# Patient Record
Sex: MALE | Race: WHITE
[De-identification: names, ages, dates, MRNs, and addresses within clinical notes are randomized per-mention and may not be internally consistent; named-entity substitution may affect disease eponyms.]

---

## 2020-10-08 ENCOUNTER — HOSPITAL ENCOUNTER (OUTPATIENT)
Dept: HOSPITAL 75 - RAD | Age: 30
End: 2020-10-08
Attending: NURSE PRACTITIONER
Payer: COMMERCIAL

## 2020-10-08 DIAGNOSIS — J34.89: Primary | ICD-10-CM
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
## 2020-10-08 PROCEDURE — 70486 CT MAXILLOFACIAL W/O DYE: CPT

## 2020-10-08 PROCEDURE — 70450 CT HEAD/BRAIN W/O DYE: CPT
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
## 2021-09-16 ENCOUNTER — HOSPITAL ENCOUNTER (OUTPATIENT)
Dept: HOSPITAL 75 - PREOP | Age: 31
Discharge: HOME | End: 2021-09-16
Attending: SURGERY
Payer: COMMERCIAL

## 2021-09-16 VITALS — HEIGHT: 70.98 IN | BODY MASS INDEX: 41.33 KG/M2 | WEIGHT: 295.2 LBS

## 2021-09-16 DIAGNOSIS — Z01.818: Primary | ICD-10-CM

## 2021-09-24 ENCOUNTER — HOSPITAL ENCOUNTER (OUTPATIENT)
Dept: HOSPITAL 75 - ENDO | Age: 31
Discharge: HOME | End: 2021-09-24
Attending: SURGERY
Payer: COMMERCIAL

## 2021-09-24 VITALS — DIASTOLIC BLOOD PRESSURE: 72 MMHG | SYSTOLIC BLOOD PRESSURE: 120 MMHG

## 2021-09-24 VITALS — SYSTOLIC BLOOD PRESSURE: 109 MMHG | DIASTOLIC BLOOD PRESSURE: 77 MMHG

## 2021-09-24 VITALS — DIASTOLIC BLOOD PRESSURE: 73 MMHG | SYSTOLIC BLOOD PRESSURE: 105 MMHG

## 2021-09-24 VITALS — DIASTOLIC BLOOD PRESSURE: 61 MMHG | SYSTOLIC BLOOD PRESSURE: 101 MMHG

## 2021-09-24 VITALS — SYSTOLIC BLOOD PRESSURE: 106 MMHG | DIASTOLIC BLOOD PRESSURE: 55 MMHG

## 2021-09-24 VITALS — BODY MASS INDEX: 41.67 KG/M2 | HEIGHT: 70.87 IN | WEIGHT: 297.62 LBS

## 2021-09-24 DIAGNOSIS — Z80.0: ICD-10-CM

## 2021-09-24 DIAGNOSIS — Z79.899: ICD-10-CM

## 2021-09-24 DIAGNOSIS — I10: ICD-10-CM

## 2021-09-24 DIAGNOSIS — E66.01: ICD-10-CM

## 2021-09-24 DIAGNOSIS — R19.4: Primary | ICD-10-CM

## 2021-09-24 DIAGNOSIS — K21.9: ICD-10-CM

## 2021-09-24 DIAGNOSIS — K62.1: ICD-10-CM

## 2021-09-24 DIAGNOSIS — Z79.891: ICD-10-CM

## 2021-09-24 DIAGNOSIS — Z87.891: ICD-10-CM

## 2021-09-24 NOTE — ANESTHESIA-GENERAL POST-OP
MAC


Patient Condition


Mental Status/LOC:  Same as Preop


Cardiovascular:  Satisfactory


Nausea/Vomiting:  Absent


Respiratory:  Satisfactory


Pain:  Controlled


Complications:  Absent





Post Op Complications


Complications


None





Follow Up Care/Instructions


Patient Instructions


None needed.





Anesthesiology Discharge Order


Discharge Order


Patient is doing well, no complaints, stable vital signs, no apparent adverse 

anesthesia problems.   


No complications reported per nursing.











FREDRICK COTA CRNA              Sep 24, 2021 11:19

## 2021-09-24 NOTE — PROGRESS NOTE-PRE OPERATIVE
Pre-Operative Progress Note


H&P Reviewed


The H&P was reviewed, patient examined and no changes noted.


Date Seen by Provider:  Sep 24, 2021


Time Seen by Provider:  08:38


Date H&P Reviewed:  Sep 24, 2021


Time H&P Reviewed:  08:38


Pre-Operative Diagnosis:  change in bowel habits, family hx colon ca











BEATA SETH DO              Sep 24, 2021 08:38

## 2021-12-23 ENCOUNTER — HOSPITAL ENCOUNTER (EMERGENCY)
Dept: HOSPITAL 75 - ER | Age: 31
Discharge: HOME | End: 2021-12-23
Payer: COMMERCIAL

## 2021-12-23 VITALS — BODY MASS INDEX: 42.01 KG/M2 | WEIGHT: 300.05 LBS | HEIGHT: 70.98 IN

## 2021-12-23 VITALS — SYSTOLIC BLOOD PRESSURE: 142 MMHG | DIASTOLIC BLOOD PRESSURE: 76 MMHG

## 2021-12-23 DIAGNOSIS — F17.210: ICD-10-CM

## 2021-12-23 DIAGNOSIS — N43.3: Primary | ICD-10-CM

## 2021-12-23 LAB
APTT PPP: YELLOW S
BACTERIA #/AREA URNS HPF: NEGATIVE /HPF
BILIRUB UR QL STRIP: NEGATIVE
FIBRINOGEN PPP-MCNC: CLEAR MG/DL
GLUCOSE UR STRIP-MCNC: NEGATIVE MG/DL
KETONES UR QL STRIP: NEGATIVE
LEUKOCYTE ESTERASE UR QL STRIP: NEGATIVE
NITRITE UR QL STRIP: NEGATIVE
PH UR STRIP: 6 [PH] (ref 5–9)
PROT UR QL STRIP: NEGATIVE
RBC #/AREA URNS HPF: (no result) /HPF
SP GR UR STRIP: >=1.03 (ref 1.02–1.02)
SQUAMOUS #/AREA URNS HPF: (no result) /HPF
WBC #/AREA URNS HPF: (no result) /HPF

## 2021-12-23 PROCEDURE — 74176 CT ABD & PELVIS W/O CONTRAST: CPT

## 2021-12-23 PROCEDURE — 81000 URINALYSIS NONAUTO W/SCOPE: CPT

## 2021-12-23 PROCEDURE — 76870 US EXAM SCROTUM: CPT

## 2021-12-23 NOTE — ED GENERAL
General


Chief Complaint:  General Problems/Pain


Stated Complaint:  GROIN PAIN


Source of Information:  Patient


Exam Limitations:  No Limitations


 (KEL LAROSE)





History of Present Illness


Date Seen by Provider:  Dec 23, 2021


Time Seen by Provider:  17:44


Initial Comments


To ER with 3 weeks of right testicular pain and foul-smelling urine but became 

worse than usual 3 days ago. No known injury.


Timing/Duration:  1-2 Days


Severity:  Moderate


 (KEL LAROSE)





Allergies and Home Medications


Allergies


Coded Allergies:  


     No Known Drug Allergies (Unverified , 2/10/16)





Patient Home Medication List


Home Medication List Reviewed:  Yes


 (KEL LAROSE)


Lisinopril (Lisinopril) 40 Mg Tablet, 40 MG PO DAILY, (Reported)


   Entered as Reported by: JULIE A IBEH on 21


Pantoprazole Sodium (Pantoprazole Sodium) 40 Mg Tablet.dr, 40 MG PO DAILY, 

(Reported)


   Entered as Reported by: JULIE A IBEH on 21





Review of Systems


Review of Systems


Constitutional:  see HPI


EENTM:  see HPI


Respiratory:  no symptoms reported


Cardiovascular:  no symptoms reported


Genitourinary:  no symptoms reported


Musculoskeletal:  no symptoms reported


Skin:  no symptoms reported


Psychiatric/Neurological:  No Symptoms Reported


Hematologic/Lymphatic:  No Symptoms Reported


Immunological/Allergic:  no symptoms reported (KEL LAROSE)





Past Medical-Social-Family Hx


Patient Social History


Tobacco Use?:  Yes


Tobacco type used:  Cigarettes


Smoking Status:  Current Everyday Smoker


Smokeless Tobacco Frequency:  Never a User


Use of E-Cig and/or Vaping dev:  No


Use of E-Cig and/or Vaping Demarco:  Never a User


Substance use?:  No


Alcohol Use?:  Yes


Alcohol Frequency:  Couple times a week


Pt feels they are or have been:  No


 (KEL LAROSE)





Immunizations Up To Date


First/Initial COVID19 Vaccinat:  2021


Second COVID19 Vaccination Murtaza:  2021


 (KEL LAROSE)





Seasonal Allergies


Seasonal Allergies:  No


 (KEL LAROSE)





Past Medical History


Surgeries:  Yes (LEFT KNEE SCOPE, LEFT FEMUR FX, BMT)


Respiratory:  No


Cardiac:  Yes


Neurological:  No


Genitourinary:  No


Gastrointestinal:  No


Musculoskeletal:  No


Endocrine:  No


HEENT:  No


Cancer:  No


Psychosocial:  No


Integumentary:  No


Blood Disorders:  No


 (KEL LAROSE)





Family Medical History





Colon cancer





Physical Exam


Vital Signs





Vital Signs - First Documented








 21





 17:24 20:00


 


Temp 36.7 


 


Pulse 93 


 


Resp 19 


 


B/P (MAP) 154/91 (112) 


 


Pulse Ox  100


 


O2 Delivery Room Air 








 (KRYSTINAJAZMINE K DO)


Vital Signs


Capillary Refill :  


 (KEL LAROSE)


Height, Weight, BMI


Height: 5'10.00"


Weight: 273lbs. 0.0oz. 123.644617hj; 41.66 BMI


Method:


General Appearance:  No Apparent Distress, WD/WN, Anxious


Eyes:  Bilateral Eye Normal Inspection, Bilateral Eye PERRL, Bilateral Eye EOMI


Respiratory:  No Accessory Muscle Use, No Respiratory Distress


Gastrointestinal:  Normal Bowel Sounds, Non Tender, Soft


Extremity:  Normal Capillary Refill, Normal Inspection


Neurologic/Psychiatric:  Alert, Oriented x3


Skin:  Normal Color, Warm/Dry


Comments


Suprapubic tenderness, right testicular tenderness


 (KEL LAROSE)





Progress/Results/Core Measures


Suspected Sepsis


SIRS


Temperature: 


Pulse:  


Respiratory Rate: 


 


Blood Pressure  / 


Mean: 


 


 (KEL LAROSE)





Results/Orders


Lab Results





Laboratory Tests








Test


 21


17:46 Range/Units


 


 


Urine Color YELLOW   


 


Urine Clarity CLEAR   


 


Urine pH 6.0  5-9  


 


Urine Specific Gravity >=1.030  1.016-1.022  


 


Urine Protein NEGATIVE  NEGATIVE  


 


Urine Glucose (UA) NEGATIVE  NEGATIVE  


 


Urine Ketones NEGATIVE  NEGATIVE  


 


Urine Nitrite NEGATIVE  NEGATIVE  


 


Urine Bilirubin NEGATIVE  NEGATIVE  


 


Urine Urobilinogen 0.2  < = 1.0  MG/DL


 


Urine Leukocyte Esterase NEGATIVE  NEGATIVE  


 


Urine RBC (Auto) NEGATIVE  NEGATIVE  


 


Urine RBC NONE   /HPF


 


Urine WBC NONE   /HPF


 


Urine Squamous Epithelial


Cells 0-2 


  /HPF





 


Urine Crystals NONE   /LPF


 


Urine Bacteria NEGATIVE   /HPF


 


Urine Casts NONE   /LPF


 


Urine Mucus NEGATIVE   /LPF


 


Urine Culture Indicated NO   





 (KRYSTINA,JAZMINE K DO)


Vital Signs/I&O











 21





 17:24 20:00


 


Temp 36.7 


 


Pulse 93 


 


Resp 19 87


 


B/P (MAP) 154/91 (112) 142/76


 


Pulse Ox  100


 


O2 Delivery Room Air Room Air








 (KRYSTINAJAZMINE K DO)


Vital Signs/I&O


Capillary Refill :  


 (KEL LAROSE)





Departure


Communication (Admissions)


NAME:   SEJAL KNIGHT


[facility] REC#:   [id number]


ACCOUNT#:   [id number]


PT STATUS:   REG ER


:   1990


PHYSICIAN:   KEL LAROSE


ADMIT DATE:   21/ER


                                   ***Draft***


Date of Exam:21





US SCROTUM (Testicle) [id number]








PROCEDURE: US Scrotum.





TECHNIQUE: Multiple real-time grayscale images were obtained over


the scrotum in various projections bilaterally.





INDICATION: Testicular pain.





FINDINGS: The right testicle measures 4.3 x 2.7 x 3.0 cm. Left


testicle measures 3.8 x 2.4 x 2.9 cm. Both testicles demonstrate


normal homogeneous echogenicity and blood flow. There are small


bilateral hydroceles. There is a 1 cm epididymal cyst on the


right. There is questionable left varicocele. There are no


discrete testicular masses.





IMPRESSION:


1. 1 cm right epididymal cyst.


2. Questionable left varicocele.


3. Small bilateral hydroceles.


4. No evidence for torsion or discrete testicular mass. 





  Dictated on workstation # [id number]








Dict:   21


Trans:   21


[facility] [id number]





Interpreted by:     RASHAWN MCHUGH MD


Electronically signed by:  


 (KEL LAROSE)





Impression





   Primary Impression:  


   Hydrocele in adult


Disposition:  01 HOME, SELF-CARE


Condition:  Stable





Departure-Patient Inst.


Decision time for Depature:  19:10


 (KEL LAROSE)


Referrals:  


NO,LOCAL PHYSICIAN (PCP)


Primary Care Physician








TAWIL,ELIAS A MD


Patient Instructions:  Hydrocele/Varicocele (DC)








ATTENDING PHYSICIAN NOTE:


I WAS PHYSICALLY PRESENT AS ER PHYSICIAN WHEN THIS PATIENT WAS IN ER, BUT I WAS 

NOT INVOLVED IN ANY DECISION MAKING OR ANY CARE OF THIS PATIENT. 


 (JAZMINE FLAHERTY DO)











KEL LAROSE             Dec 23, 2021 17:47


JAZMINE FLAHERTY DO                 Dec 25, 2021 12:37

## 2021-12-23 NOTE — DIAGNOSTIC IMAGING REPORT
PROCEDURE: CT abdomen and pelvis without contrast.



TECHNIQUE: Multiple contiguous axial images were obtained through

the abdomen and pelvis without the use of intravenous contrast.

Auto Exposure Controls were utilized during the CT exam to meet

ALARA standards for radiation dose reduction. 



INDICATION: Abdominal pain.



FINDINGS: The heart size is normal. The lung bases are clear. The

liver is normal in size and without focal lesions. Gallbladder is

contracted. There is no biliary ductal dilatation. The spleen is

normal. Pancreas and adrenal glands are unremarkable. Kidneys are

normal in appearance. Aorta is nonaneurysmal. The appendix is

normal. The bowel gas pattern is nonspecific. There is no free

air. There is no ascites. There are no focal inflammatory

changes. Bladder is normal. There is no pelvic mass, adenopathy,

or free fluid. The osseous structures are unremarkable.



IMPRESSION: Unremarkable noncontrast CT abdomen and pelvis.



Dictated by: 



  Dictated on workstation # [id number]